# Patient Record
Sex: FEMALE | Race: WHITE | NOT HISPANIC OR LATINO | Employment: FULL TIME | URBAN - METROPOLITAN AREA
[De-identification: names, ages, dates, MRNs, and addresses within clinical notes are randomized per-mention and may not be internally consistent; named-entity substitution may affect disease eponyms.]

---

## 2023-09-09 ENCOUNTER — OFFICE VISIT (OUTPATIENT)
Dept: URGENT CARE | Facility: URGENT CARE | Age: 63
End: 2023-09-09
Payer: COMMERCIAL

## 2023-09-09 VITALS
OXYGEN SATURATION: 98 % | SYSTOLIC BLOOD PRESSURE: 132 MMHG | HEART RATE: 75 BPM | WEIGHT: 176 LBS | DIASTOLIC BLOOD PRESSURE: 92 MMHG | RESPIRATION RATE: 16 BRPM

## 2023-09-09 DIAGNOSIS — T78.40XA ALLERGIC REACTION, INITIAL ENCOUNTER: Primary | ICD-10-CM

## 2023-09-09 PROCEDURE — 99203 OFFICE O/P NEW LOW 30 MIN: CPT | Performed by: FAMILY MEDICINE

## 2023-09-09 RX ORDER — OMEGA-3/DHA/EPA/FISH OIL 300-1000MG
CAPSULE ORAL
COMMUNITY

## 2023-09-09 RX ORDER — TRIAMCINOLONE ACETONIDE 1 MG/G
CREAM TOPICAL 2 TIMES DAILY
Qty: 45 G | Refills: 0 | Status: SHIPPED | OUTPATIENT
Start: 2023-09-09

## 2023-09-09 RX ORDER — LOSARTAN POTASSIUM 50 MG/1
1.5 TABLET ORAL DAILY
COMMUNITY
Start: 2023-02-27 | End: 2024-02-27

## 2023-09-09 RX ORDER — PREDNISONE 20 MG/1
20 TABLET ORAL DAILY
Qty: 7 TABLET | Refills: 0 | Status: SHIPPED | OUTPATIENT
Start: 2023-09-09 | End: 2023-09-16

## 2023-09-09 RX ORDER — FAMOTIDINE 20 MG/1
20 TABLET, FILM COATED ORAL DAILY
COMMUNITY

## 2023-09-09 NOTE — PROGRESS NOTES
Assessment & Plan     Allergic reaction, initial encounter  Multiple wasp stings 7 days ago that are not improving.  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  Complaints of irritated and she is using topical Benadryl as well as calamine lotion and oral antihistamines.  I do recommend prednisone course along with triamcinolone topically x10 days.  Cool compresses can be used.  Antihistamines can be continued.  Did caution infected leg symptoms such as tongue swelling, wheezing and/or lip swelling develop she should be seen emergently in the ER.  - predniSONE (DELTASONE) 20 MG tablet; Take 1 tablet (20 mg) by mouth daily for 7 days  - triamcinolone (KENALOG) 0.1 % external cream; Apply topically 2 times daily                 No follow-ups on file.    Evonne Dunham MD  [facility] [place]    Reena Emerson is a 63 year old, presenting for the following health issues:  Insect Bites (Wasp stings 1 week ago- was ok, now swollen, and red, and warm)      HPI   Pleasant 61-year-old female with a history of the venom allergy coming in today with ground wasp stings 7 days ago.  Acutely painful and swollen with initial malaise however those symptoms have improved.  Notes that she has had ongoing swelling irritation throughout the week although no other multisystem involvement including difficulty with breathing and/or wheezing, tongue or lip swelling.          Review of Systems   Constitutional, HEENT, cardiovascular, pulmonary, gi and gu systems are negative, except as otherwise noted.      Objective    BP (!) 132/92   Pulse 75   Resp 16   Wt 79.8 kg (176 lb)   SpO2 98%   There is no height or weight on file to calculate BMI.  Physical Exam   GENERAL: healthy, alert and no distress  NECK: no adenopathy, no asymmetry, masses, or scars and thyroid normal to palpation  RESP: lungs clear to auscultation - no rales, rhonchi or  wheezes  CV: regular rate and rhythm, normal S1 S2, no S3 or S4, no murmur, click or rub, no peripheral edema and peripheral pulses strong  ABDOMEN: soft, nontender, no hepatosplenomegaly, no masses and bowel sounds normal  MS: no gross musculoskeletal defects noted, no edema  SKIN: Multiple raised wheals on torso and bilateral lower extremities.    No results found for any visits on 09/09/23.

## 2023-10-22 ENCOUNTER — HEALTH MAINTENANCE LETTER (OUTPATIENT)
Age: 63
End: 2023-10-22

## 2024-12-15 ENCOUNTER — HEALTH MAINTENANCE LETTER (OUTPATIENT)
Age: 64
End: 2024-12-15